# Patient Record
Sex: FEMALE | Race: WHITE | NOT HISPANIC OR LATINO | Employment: FULL TIME | URBAN - METROPOLITAN AREA
[De-identification: names, ages, dates, MRNs, and addresses within clinical notes are randomized per-mention and may not be internally consistent; named-entity substitution may affect disease eponyms.]

---

## 2024-02-03 ENCOUNTER — APPOINTMENT (OUTPATIENT)
Dept: RADIOLOGY | Facility: CLINIC | Age: 39
End: 2024-02-03
Payer: COMMERCIAL

## 2024-02-03 ENCOUNTER — HOSPITAL ENCOUNTER (EMERGENCY)
Facility: CLINIC | Age: 39
Discharge: HOME | End: 2024-02-03
Payer: COMMERCIAL

## 2024-02-03 VITALS
HEART RATE: 73 BPM | HEIGHT: 63 IN | RESPIRATION RATE: 16 BRPM | WEIGHT: 140 LBS | SYSTOLIC BLOOD PRESSURE: 131 MMHG | BODY MASS INDEX: 24.8 KG/M2 | OXYGEN SATURATION: 100 % | TEMPERATURE: 98 F | DIASTOLIC BLOOD PRESSURE: 72 MMHG

## 2024-02-03 DIAGNOSIS — M25.521 RIGHT ELBOW PAIN: ICD-10-CM

## 2024-02-03 PROCEDURE — 73080 X-RAY EXAM OF ELBOW: CPT | Mod: RT

## 2024-02-03 PROCEDURE — 99284 EMERGENCY DEPT VISIT MOD MDM: CPT | Mod: 25

## 2024-02-03 PROCEDURE — 96372 THER/PROPH/DIAG INJ SC/IM: CPT

## 2024-02-03 PROCEDURE — 73030 X-RAY EXAM OF SHOULDER: CPT | Mod: RT

## 2024-02-03 PROCEDURE — 250N000011 HC RX IP 250 OP 636

## 2024-02-03 RX ORDER — KETOROLAC TROMETHAMINE 15 MG/ML
15 INJECTION, SOLUTION INTRAMUSCULAR; INTRAVENOUS ONCE
Status: COMPLETED | OUTPATIENT
Start: 2024-02-03 | End: 2024-02-03

## 2024-02-03 RX ORDER — IBUPROFEN 100 MG/5ML
10 SUSPENSION, ORAL (FINAL DOSE FORM) ORAL EVERY 8 HOURS PRN
Qty: 273 ML | Refills: 0 | Status: SHIPPED | OUTPATIENT
Start: 2024-02-03

## 2024-02-03 RX ADMIN — KETOROLAC TROMETHAMINE 15 MG: 15 INJECTION, SOLUTION INTRAMUSCULAR; INTRAVENOUS at 21:04

## 2024-02-03 ASSESSMENT — ACTIVITIES OF DAILY LIVING (ADL): ADLS_ACUITY_SCORE: 35

## 2024-02-04 NOTE — ED TRIAGE NOTES
Arrives to ED with c/o mechanical fall that occurred approximately 2 hours PTA. Pt landed on R elbow. Slight swelling noted. Difficulty bending elbow. +radial pulse. Denies head trauma/LOC.      Triage Assessment (Adult)       Row Name 02/03/24 2005          Triage Assessment    Airway WDL WDL        Respiratory WDL    Respiratory WDL WDL        Skin Circulation/Temperature WDL    Skin Circulation/Temperature WDL WDL        Cardiac WDL    Cardiac WDL WDL        Peripheral/Neurovascular WDL    Peripheral Neurovascular WDL WDL        Cognitive/Neuro/Behavioral WDL    Cognitive/Neuro/Behavioral WDL WDL

## 2024-02-04 NOTE — DISCHARGE INSTRUCTIONS
[facility] for [facility] right elbow pain. Your X-rays were reassuring and did not show any broken bones.     Pain Management at Home    You can take ibuprofen (Motrin/Advil) as needed. Take ibuprofen with food to avoid an upset stomach. You can also take famotidine (Pepcid) if you feel like the ibuprofen is giving you heartburn. This medicine is available over the counter.    You can take 600 mg of Ibuprofen (Motrin, Advil) by mouth with food every 6 to 8 hours. The maximum dose of ibuprofen is 3200 mg in a 24 hour period. Please do not take more than 3200 mg in 24 hours. Taking this medication with food can help to prevent stomach irritation. With long-term use this medication can irritate the stomach causing pain and can lead to development of a stomach ulcer. If you notice stomach pain or vomiting of coffee-ground colored vomit or blood, please be seen by a healthcare provider. Attempt to use this medication for the shortest time possible.     Please follow-up with your primary doctor will as soon as possible for close recheck. Return to the emergency department if you develop any new or worsening symptoms including but not limited to increased pain numbness, tingling, weakness.

## 2024-02-04 NOTE — ED PROVIDER NOTES
EMERGENCY DEPARTMENT ENCOUNTER      NAME: Mallory Schultz  AGE: 38 year old female  YOB: 1985  MRN: [id number]  EVALUATION DATE & TIME: 02/03/24 8:15 PM    PCP: No primary care provider on file.    ED PROVIDER: Cordelia Franklin PA-C      CHIEF COMPLAINT:  Arm Injury      FINAL IMPRESSION:  1. Right elbow pain          ED COURSE & MEDICAL DECISION MAKING:  Pertinent Labs & Imaging studies reviewed. (See chart for details)    The patient is an otherwise healthy right-hand-dominant 38-year-old female presenting to the emergency department today for right elbow pain after mechanical trip and fall prior to arrival.  No head impact, loss of consciousness.  No numbness, tingling, weakness.  No fevers.  No lacerations, active bleeding, ecchymosis.    Initial vital signs reviewed and all within normal limits.  On exam, she is clinically well-appearing and is nontoxic-appearing resting comfortably in hospital bed in no acute distress.  No midline bony cervical, thoracic, lumbar tenderness to palpation, no crepitus, step-offs, deformities.  Full range of motion of her neck.  No nuchal rigidity.  No obvious deformity of right upper extremity.  No tenderness to palpation overlying AC joint, clavicle, upper humerus on the right.  She does have tenderness overlying the distal humerus and olecranon on the right arm, no tenderness to palpation overlying the medial or lateral epicondyles.  No tenderness throughout, wrist or hand.  No snuffbox tenderness.  Strength and sensation intact and symmetric.  Full active and passive range of motion.  Capillary refill less than 2 seconds.    Differential diagnosis includes olecranon fracture, radial head fracture, distal humerus fracture, bony dislocation, bony contusion, musculoskeletal sprain/strain, ligamentous strain.  Low clinical suspicion for septic bursitis, septic joint, biceps tendon rupture, arthritis, gout, septic arthritis, rheumatoid arthritis, osteoarthritis,  avascular necrosis, tumor, epicondylitis, olecranon bursitis.  Discussed options for workup and management with the patient.  She shares that she does not swallow pills well, therefore did order IM ketorolac.    X-rays reassuring with no acute fracture or dislocation.  Overall, patient history, exam, workup here most consistent with right elbow pain, suspect most likely musculoskeletal sprain/strain versus bony contusion.  The patient feels improved after IM ketorolac.  Prescription provided for ibuprofen suspension for her to take as needed for pain at home.  I updated the patient on her results and discussed plan for discharge to home with RICE method and ibuprofen as needed.  Discussed plan for close follow-up with primary doctor as soon as possible for recheck.  Patient verbalized understanding and is this plan.  Strict return precautions discussed.    At the conclusion of the encounter I discussed the results of all of the tests and the disposition. The questions were answered. The patient or family acknowledged understanding and was agreeable with the care plan.       ED COURSE:  8:19 PM  I met and introduced myself to the patient. I gathered initial history and performed an initial physical exam. We discussed options and plan for diagnostics and treatment here in the ED.  9:38 PM  I discussed the plan for discharge with the patient, and patient is agreeable. We discussed supportive cares at home and reasons for return to the ER including new or worsening symptoms - all questions and concerns addressed to the best of my ability. Strict return precautions discussed. Patient to be discharged by RN.        History:  Supplemental history from: Documented in chart  External Record(s) reviewed: Documented in chart    Work Up:  Chart documentation includes differential considered and any EKGs or imaging independently interpreted by provider, where specified.  In additional to work up documented, I considered the  following work up: Documented in chart, if applicable.    External consultation:  Discussion of management with another provider: Documented in chart, if applicable    Complicating factors:  Care impacted by chronic illness: N/A  Care affected by social determinants of health: N/A    Disposition considerations: Discharge. I prescribed additional prescription strength medication(s) as charted. See documentation for any additional details.          MEDICATIONS GIVEN IN THE EMERGENCY:  Medications   ketorolac (TORADOL) injection 15 mg (15 mg Intramuscular $Given 2/3/24 9037)       NEW PRESCRIPTIONS STARTED AT TODAY'S ER VISIT  New Prescriptions    IBUPROFEN (ADVIL/MOTRIN) 100 MG/5ML SUSPENSION    Take 30 mLs (600 mg) by mouth every 8 hours as needed for moderate pain          =================================================================    HPI    Patient information was obtained from: the patient and her     Use of Intrepreter: N/A     Mallory Schultz is a 38 year old female with a limited pertinent medical history who presents to the emergency department for evaluation of elbow injury.     Patient reports around 6:00 PM (2.5 hours prior to arrival) patient had a mechanical fall after walking and stepping down off a curb that she thought would be more shallow than it was. The patient tripped and fell, landing on her right elbow first and then onto her knees. No head impact. No loss of consciousness. Pain in her elbow is worse with movement. Denies shortness of breath, chest pain, severe headache, any other associated symptoms prior to fall. Patient is right handed. No use of pain medications prior to arrival. No chronic medical issues. Denies numbness, weakness, and tingling sensation in right elbow, knee pain, pain or injury elsewhere secondary to fall, fever.       PAST MEDICAL HISTORY:  No past medical history on file.    PAST SURGICAL HISTORY:  No past surgical history on file.    CURRENT MEDICATIONS:   "  None       ALLERGIES:  No Known Allergies    FAMILY HISTORY:  No family history on file.    SOCIAL HISTORY:        VITALS:    First Vitals:  Patient Vitals for the past 24 hrs:   BP Temp Temp src Pulse Resp SpO2 Height Weight   02/03/24 2003 131/72 98  F (36.7  C) Temporal 73 16 100 % 1.6 m (5' 3\") 63.5 kg (140 lb)       Patient Vitals for the past 24 hrs:   BP Temp Temp src Pulse Resp SpO2 Height Weight   02/03/24 2003 131/72 98  F (36.7  C) Temporal 73 16 100 % 1.6 m (5' 3\") 63.5 kg (140 lb)         PHYSICAL EXAM  VITAL SIGNS: /72   Pulse 73   Temp 98  F (36.7  C) (Temporal)   Resp 16   Ht 1.6 m (5' 3\")   Wt 63.5 kg (140 lb)   LMP 01/29/2024   SpO2 100%   BMI 24.80 kg/m     GENERAL: Awake, alert, answering questions appropriately, resting comfortably in hospital bed in no acute distress.  HEENT: Conjunctiva clear, no drainage.  SPEECH:  Easy to understand speech, Normal volume and reagan. Normal phonation.  PULMONARY: No respiratory distress, Breathing comfortably on room air. Lungs clear to auscultation bilaterally.  CARDIOVASCULAR: Regular rate and rhythm, radial pulses present, symmetric, and normal.  ABDOMINAL: Soft, Nondistended, Nontender, No rebound or guarding, No palpable masses.  BACK: No midline cervical, thoracic, lumbar bony tenderness to palpation. No crepitus or step offs or deformities.  EXTREMITIES: Extremities are warm and well perfused.   Right Arm: No deformity, skin intact. Non-tender to palpation over clavicle, AC joint, shoulder, forearm, wrist.. Tender over right distal arm, right olecranon, no significant tenderness at epicondyles.  Normal ROM shoulder, elbow, wrist without pain. Full AROM of elbow with some pain. PROM intact. 5/5 strength throughout. + FPL/EPL/intrinsics. SILT over ax/m/r/u distributions. Radial pulse palpable and symmetric. Cap refill brisk x5 fingers.  Biceps tendon palpated and nontender.  No snuffbox tenderness.  NEUROLOGIC: Alert and oriented x 3, " GCS 15.  Moving all extremities spontaneously.   SKIN: Exposed areas of skin warm, dry, no rashes.  PSYCH: Normal mood and affect.           RADIOLOGY/LAB:  Reviewed all pertinent imaging. Please see official radiology report.  All pertinent labs reviewed and interpreted.  Results for orders placed or performed during the hospital encounter of 02/03/24   Elbow XR, G/E 3 views, right    Impression    IMPRESSION: Normal joint spaces and alignment. No fracture or joint effusion.   XR Shoulder Right 2 Views    Impression    IMPRESSION: Normal joint spaces and alignment. No fracture. Chronic appearing ossicles at the superior aspect of the acromioclavicular joint.                 I, Flor Elder, am serving as a scribe to document services personally performed by Cordelia Franklin PA-C, based on my observation and the provider's statements to me. I, Cordelia Franklin PA-C attest that Flor Elder is acting in a scribe capacity, has observed my performance of the services and has documented them in accordance with my direction.         Cordelia Franklin PA-C  Emergency Medicine  [facility] EMERGENCY ROOM  [id number] [id number] [phone]  Dept: [phone]     Cordelia Franklin PA-C  02/03/24 2207       Cordelia Franklin PA-C  02/03/24 2209